# Patient Record
(demographics unavailable — no encounter records)

---

## 2018-01-10 NOTE — EMERGENCY ROOM REPORT
History of Present Illness


General


Chief Complaint:  Pain





Present Illness


HPI


39 YO Male presents to the ED C/O pain upon walking x 3 month s/p base ball 

sliding injury.Denies numbness tingling or loss of sensation or gross motor 

movements of the extremities, incontinence of bowel or bladder. Denies CP, 

Palpitations, LOC, AMS, dizziness, Changes in Vision, Sensation, paresthesias, 

or a sudden severe headache.


Allergies:  


Coded Allergies:  


     NO KNOWN DRUG ALLERGIES (Verified  Allergy, 12/26/13)





Patient History


Past Medical History:  see triage record


Past Surgical History:  none


Pertinent Family History:  none


Reviewed Nursing Documentation:  PMH: Agreed, PSxH: Agreed





Nursing Documentation-PMH


Hx Cancer:  No


Hx Neurological Problems:  Yes - HEADACHES





Review of Systems


All Other Systems:  negative except mentioned in HPI





Physical Exam





Vital Signs








  Date Time  Temp Pulse Resp B/P (MAP) Pulse Ox O2 Delivery O2 Flow Rate FiO2


 


1/10/18 14:34 97.5 83 20 123/76 99 Room Air  








Sp02 EP Interpretation:  reviewed, normal


General Appearance:  no apparent distress, alert, GCS 15, non-toxic


Head:  normocephalic, atraumatic


Eyes:  bilateral eye normal inspection, bilateral eye PERRL


ENT:  hearing grossly normal, normal voice


Neck:  full range of motion


Respiratory:  lungs clear, normal breath sounds, speaking full sentences


Cardiovascular #1:  regular rate, rhythm, no edema, normal capillary refill


Rectal:  deferred


Genitourinary:  normal inspection


Musculoskeletal:  back normal, gait/station normal, normal range of motion, 

tender - TTP to the anterior/dorsum of the left ankle, negative calf squeeze, 

FROM with pain, NVI, no bruises or obvious deformities.


Neurologic:  alert, oriented x3, responsive, motor strength/tone normal, 

sensory intact, speech normal, grossly normal


Psychiatric:  judgement/insight normal


Skin:  normal color, no rash, warm/dry, well hydrated


Lymphatic:  no adenopathy





Medical Decision Making


PA Attestation


Dr. ward is my supervising Physician whom patient management has been 

discussed with.


Diagnostic Impression:  


 Primary Impression:  


 Foot fracture, left


 Qualified Codes:  S92.902A - Unspecified fracture of left foot, initial 

encounter for closed fracture


 Additional Impression:  


 Avulsion fracture of calcaneus with delayed healing


 Qualified Codes:  S92.035G - Nondisplaced avulsion fracture of tuberosity of 

left calcaneus, subsequent encounter for fracture with delayed healing


ER Course


39 YO Male presents to the ED C/O 8/10 in severity pain to the left ankle upon 

walking x 3 months s/p base ball sliding injury. Denies numbness tingling or 

loss of sensation or gross motor movements of the extremities, incontinence of 

bowel or bladder. Denies CP, Palpitations, LOC, AMS, dizziness, Changes in 

Vision, Sensation, paresthesias, or a sudden severe headache. 





Ddx considered but are not limited to Fracture, dislocation, contusion,  Sprain/

Strain/Spasm, avulsion fx. 





Vital signs: are WNL, pt. is afebrile





H&PE are most consistent with musculoskeletal injury will perform imaging to r/

o fractures/dislocations. 





ORDERS:





-  X-ray Left Ankle 3 views   - POSITIVE FOR OLD FRACTURES to the 1st and 

second metatarsals, and avulsion of the tibia,  no acute fractures, Dislocation

, or significant soft tissue injury, per preliminary read in ED, and signed by  

RENEE Henry, my supervising physician has reviewed, and agrees with my 

interpretation.





ED INTERVENTIONS:





-Ortho Cast shoe applied  by ED tech. Pt. remains neurovascularly intact. 


-Patient is provided with crutches and instructed on their use








--Patient is given copies of his x-rays.  Discussed with patient that he needs 

to followup with orthopedic specialist, and he may even require MRI as well 

this will be evaluated for by his primary care or orthopedic specialist. 








DISCHARGE: At this time pt. is stable for d/c to home. Will provide printed 

patient care instructions, and any necessary prescriptions. Care plan and 

follow up instructions have been discussed with the patient prior to discharge.


Other X-Ray Diagnostic Results


Other X-Ray Diagnostic Results :  


   X-Ray ordered:  Left Ankle


   # of Views/Limited Vs Complete:  3 View


   Indication:  Pain


   EP Interpretation:  Yes


   PA Xray:  Interpretation reviewed, by supervising MD, and agrees with 

findings.


   Interpretation:  no dislocation, no soft tissue swelling, other - no acute 

fractures, evidence of old fractures with poor healing/malunion.


   Impression:  Other - POSITIVE FOR OLD FRACTURES to the 1st and second 

metatarsals, and avulsion of the tibia,  no acute fractures, Dislocation, or 

significant soft tissue injury,


   Electronically Signed by:  Donna Henry PA-c





Last Vital Signs








  Date Time  Temp Pulse Resp B/P (MAP) Pulse Ox O2 Delivery O2 Flow Rate FiO2


 


1/10/18 14:34 97.5 83 20 123/76 99 Room Air  








Disposition:  HOME, SELF-CARE


Condition:  Stable


Scripts


Acetaminophen With Codeine (T#3) (TYLENOL #3 TAB*) Y Tab


1 TAB ORAL Q6HR Y for For Pain, #10 TAB


   Prov: Donna Henry         1/10/18 


Ibuprofen* (MOTRIN*) 600 Mg Tablet


600 MG ORAL THREE TIMES A DAY, #30 TAB 0 Refills


   Prov: Donna Henry         1/10/18


Referrals:  


NOT CHOSEN [facility]/MD,REFERRING (PCP)


Patient Instructions:  Ankle Fracture, Easy-to-Read, Avulsion Fracture of the 

Foot





Additional Instructions:  


Take medications as directed. 





 ** Follow up with a Primary Care Provider For ORTHOPEDIC SPECIALIST REFERRAL 

in 3-5 days, even if your symptoms have resolved. ** 


--Please review list of primary care clinics, if you do not already have a 

primary care provider


** MRI may be necessary if symptoms continue, follow up with PCP/ORTHO for this 

referral. 





Return sooner to ED if new symptoms occur, or current symptoms become worse. 


Do not drink alcohol, drive, or operate heavy machinery while taking Tylenol #3

  as this may cause drowsiness. 











- Please note that this Emergency Department Report was dictated using [employer] technology software, occasionally this can lead to 

erroneous entry secondary to interpretation by the dictation equipment.











Donna Henry Omar 10, 2018 15:51

## 2018-01-10 NOTE — DIAGNOSTIC IMAGING REPORT
Indication: Pain

 

Technique: XRAY Ankle Compl Min 3v L

 

Comparison: None

 

Findings: There is no acute fracture. A well-corticated ossific density projecting

just. The tip of the medial malleolus may represent sequela of remote trauma or

accessory ossicle. The ankle mortise is intact on these nonstressed views. There is

no significant ankle joint effusion. No radiopaque foreign body seen.

 

Impression: No acute fracture or dislocation.